# Patient Record
Sex: FEMALE | Race: OTHER | HISPANIC OR LATINO
[De-identification: names, ages, dates, MRNs, and addresses within clinical notes are randomized per-mention and may not be internally consistent; named-entity substitution may affect disease eponyms.]

---

## 2017-06-29 ENCOUNTER — TRANSCRIPTION ENCOUNTER (OUTPATIENT)
Age: 47
End: 2017-06-29

## 2018-11-29 ENCOUNTER — RX CHANGE (OUTPATIENT)
Age: 48
End: 2018-11-29

## 2018-12-14 ENCOUNTER — RESULT REVIEW (OUTPATIENT)
Age: 48
End: 2018-12-14

## 2019-04-29 ENCOUNTER — RX RENEWAL (OUTPATIENT)
Age: 49
End: 2019-04-29

## 2019-04-30 ENCOUNTER — RECORD ABSTRACTING (OUTPATIENT)
Age: 49
End: 2019-04-30

## 2019-04-30 ENCOUNTER — RX RENEWAL (OUTPATIENT)
Age: 49
End: 2019-04-30

## 2019-04-30 DIAGNOSIS — Z82.49 FAMILY HISTORY OF ISCHEMIC HEART DISEASE AND OTHER DISEASES OF THE CIRCULATORY SYSTEM: ICD-10-CM

## 2019-04-30 DIAGNOSIS — Z84.1 FAMILY HISTORY OF DISORDERS OF KIDNEY AND URETER: ICD-10-CM

## 2019-04-30 DIAGNOSIS — R87.629 UNSPECIFIED ABNORMAL CYTOLOGICAL FINDINGS IN SPECIMENS FROM VAGINA: ICD-10-CM

## 2019-04-30 DIAGNOSIS — Z86.018 PERSONAL HISTORY OF OTHER BENIGN NEOPLASM: ICD-10-CM

## 2019-04-30 DIAGNOSIS — D25.9 LEIOMYOMA OF UTERUS, UNSPECIFIED: ICD-10-CM

## 2019-04-30 DIAGNOSIS — Z82.3 FAMILY HISTORY OF STROKE: ICD-10-CM

## 2019-04-30 DIAGNOSIS — Z83.3 FAMILY HISTORY OF DIABETES MELLITUS: ICD-10-CM

## 2019-04-30 LAB — CYTOLOGY CVX/VAG DOC THIN PREP: NORMAL

## 2019-04-30 RX ORDER — MULTIVITAMIN
TABLET ORAL
Refills: 0 | Status: ACTIVE | COMMUNITY

## 2019-07-24 ENCOUNTER — RX RENEWAL (OUTPATIENT)
Age: 49
End: 2019-07-24

## 2019-07-29 ENCOUNTER — RX RENEWAL (OUTPATIENT)
Age: 49
End: 2019-07-29

## 2019-08-06 ENCOUNTER — RX RENEWAL (OUTPATIENT)
Age: 49
End: 2019-08-06

## 2019-08-06 DIAGNOSIS — N39.0 URINARY TRACT INFECTION, SITE NOT SPECIFIED: ICD-10-CM

## 2019-10-21 ENCOUNTER — RX RENEWAL (OUTPATIENT)
Age: 49
End: 2019-10-21

## 2019-11-04 ENCOUNTER — APPOINTMENT (OUTPATIENT)
Dept: OBGYN | Facility: CLINIC | Age: 49
End: 2019-11-04
Payer: COMMERCIAL

## 2019-11-04 VITALS
BODY MASS INDEX: 29.45 KG/M2 | WEIGHT: 150 LBS | DIASTOLIC BLOOD PRESSURE: 78 MMHG | HEIGHT: 60 IN | SYSTOLIC BLOOD PRESSURE: 128 MMHG

## 2019-11-04 DIAGNOSIS — D25.0 SUBMUCOUS LEIOMYOMA OF UTERUS: ICD-10-CM

## 2019-11-04 PROCEDURE — 76830 TRANSVAGINAL US NON-OB: CPT

## 2019-11-04 PROCEDURE — 99396 PREV VISIT EST AGE 40-64: CPT

## 2019-11-06 ENCOUNTER — TRANSCRIPTION ENCOUNTER (OUTPATIENT)
Age: 49
End: 2019-11-06

## 2019-12-03 ENCOUNTER — LABORATORY RESULT (OUTPATIENT)
Age: 49
End: 2019-12-03

## 2019-12-06 ENCOUNTER — RESULT REVIEW (OUTPATIENT)
Age: 49
End: 2019-12-06

## 2020-04-14 ENCOUNTER — RX RENEWAL (OUTPATIENT)
Age: 50
End: 2020-04-14

## 2020-11-16 ENCOUNTER — RESULT REVIEW (OUTPATIENT)
Age: 50
End: 2020-11-16

## 2020-11-16 ENCOUNTER — LABORATORY RESULT (OUTPATIENT)
Age: 50
End: 2020-11-16

## 2020-11-16 ENCOUNTER — APPOINTMENT (OUTPATIENT)
Dept: OBGYN | Facility: CLINIC | Age: 50
End: 2020-11-16
Payer: COMMERCIAL

## 2020-11-16 VITALS
DIASTOLIC BLOOD PRESSURE: 82 MMHG | WEIGHT: 152 LBS | BODY MASS INDEX: 29.84 KG/M2 | HEIGHT: 60 IN | SYSTOLIC BLOOD PRESSURE: 126 MMHG

## 2020-11-16 DIAGNOSIS — Z78.0 ASYMPTOMATIC MENOPAUSAL STATE: ICD-10-CM

## 2020-11-16 DIAGNOSIS — B96.89 ACUTE VAGINITIS: ICD-10-CM

## 2020-11-16 DIAGNOSIS — N76.0 ACUTE VAGINITIS: ICD-10-CM

## 2020-11-16 PROCEDURE — 99072 ADDL SUPL MATRL&STAF TM PHE: CPT

## 2020-11-16 PROCEDURE — 99396 PREV VISIT EST AGE 40-64: CPT

## 2020-11-16 RX ORDER — LEVONORGESTREL AND ETHINYL ESTRADIOL 0.15-0.03
0.15-0.03 KIT ORAL DAILY
Qty: 91 | Refills: 0 | Status: DISCONTINUED | COMMUNITY
Start: 2019-04-29 | End: 2020-11-16

## 2020-11-16 RX ORDER — LEVOFLOXACIN 250 MG/1
250 TABLET, FILM COATED ORAL DAILY
Qty: 5 | Refills: 0 | Status: DISCONTINUED | COMMUNITY
Start: 2019-08-06 | End: 2020-11-16

## 2020-11-16 RX ORDER — LEVONORGESTREL / ETHINYL ESTRADIOL AND ETHINYL ESTRADIOL 150-30(84)
0.15-0.03 &0.01 KIT ORAL DAILY
Refills: 0 | Status: DISCONTINUED | COMMUNITY
End: 2020-11-16

## 2020-11-16 RX ORDER — LEVONORGESTREL AND ETHINYL ESTRADIOL AND ETHINYL ESTRADIOL 150-30(84)
0.15-0.03 &0.01 KIT ORAL
Qty: 91 | Refills: 1 | Status: DISCONTINUED | COMMUNITY
Start: 2019-04-30 | End: 2020-11-16

## 2020-11-16 RX ORDER — TINIDAZOLE 500 MG/1
500 TABLET, FILM COATED ORAL DAILY
Qty: 8 | Refills: 1 | Status: DISCONTINUED | COMMUNITY
Start: 2018-11-29 | End: 2020-11-16

## 2020-11-16 NOTE — HISTORY OF PRESENT ILLNESS
[FreeTextEntry1] : 51yo  amenorrheic since stopping Camrese 0.15-0.03 last year here for annual Gyn exam. Has noticed return of BV sx(but mild)\par            Pt with h/o LGSIL treated with cryo ...has had ASCUS Paps but HPV negative. Last Pap 2017-> Neg/HPV Neg  \par  [Mammogramdate] : 12/14/18 [TextBox_19] : BIRADS 2(scheduled for mammo later today) [PapSmeardate] : 4/26/17 [TextBox_31] : Neg/HPV Neg

## 2020-11-20 LAB
CANDIDA VAG CYTO: NOT DETECTED
CYTOLOGY CVX/VAG DOC THIN PREP: ABNORMAL
G VAGINALIS+PREV SP MTYP VAG QL MICRO: DETECTED
HPV HIGH+LOW RISK DNA PNL CVX: DETECTED
T VAGINALIS VAG QL WET PREP: NOT DETECTED

## 2020-12-21 PROBLEM — N39.0 ACUTE UTI: Status: RESOLVED | Noted: 2019-08-06 | Resolved: 2020-12-21

## 2020-12-23 PROBLEM — N76.0 BACTERIAL VAGINOSIS: Status: RESOLVED | Noted: 2020-11-16 | Resolved: 2020-12-23

## 2021-01-19 ENCOUNTER — RESULT REVIEW (OUTPATIENT)
Age: 51
End: 2021-01-19

## 2021-01-28 ENCOUNTER — TRANSCRIPTION ENCOUNTER (OUTPATIENT)
Age: 51
End: 2021-01-28

## 2021-01-29 ENCOUNTER — TRANSCRIPTION ENCOUNTER (OUTPATIENT)
Age: 51
End: 2021-01-29

## 2021-02-05 ENCOUNTER — TRANSCRIPTION ENCOUNTER (OUTPATIENT)
Age: 51
End: 2021-02-05

## 2021-03-31 ENCOUNTER — TRANSCRIPTION ENCOUNTER (OUTPATIENT)
Age: 51
End: 2021-03-31

## 2021-04-15 ENCOUNTER — TRANSCRIPTION ENCOUNTER (OUTPATIENT)
Age: 51
End: 2021-04-15

## 2021-05-13 ENCOUNTER — TRANSCRIPTION ENCOUNTER (OUTPATIENT)
Age: 51
End: 2021-05-13

## 2021-05-18 ENCOUNTER — NON-APPOINTMENT (OUTPATIENT)
Age: 51
End: 2021-05-18

## 2021-05-19 ENCOUNTER — APPOINTMENT (OUTPATIENT)
Dept: OBGYN | Facility: CLINIC | Age: 51
End: 2021-05-19
Payer: COMMERCIAL

## 2021-05-19 ENCOUNTER — ASOB RESULT (OUTPATIENT)
Age: 51
End: 2021-05-19

## 2021-05-19 VITALS
TEMPERATURE: 98 F | HEIGHT: 60 IN | BODY MASS INDEX: 29.84 KG/M2 | DIASTOLIC BLOOD PRESSURE: 78 MMHG | SYSTOLIC BLOOD PRESSURE: 120 MMHG | WEIGHT: 152 LBS

## 2021-05-19 DIAGNOSIS — R87.610 ATYPICAL SQUAMOUS CELLS OF UNDETERMINED SIGNIFICANCE ON CYTOLOGIC SMEAR OF CERVIX (ASC-US): ICD-10-CM

## 2021-05-19 DIAGNOSIS — Z87.898 PERSONAL HISTORY OF OTHER SPECIFIED CONDITIONS: ICD-10-CM

## 2021-05-19 DIAGNOSIS — Z00.01 ENCOUNTER FOR GENERAL ADULT MEDICAL EXAMINATION WITH ABNORMAL FINDINGS: ICD-10-CM

## 2021-05-19 DIAGNOSIS — Z92.89 PERSONAL HISTORY OF OTHER MEDICAL TREATMENT: ICD-10-CM

## 2021-05-19 DIAGNOSIS — N89.8 OTHER SPECIFIED NONINFLAMMATORY DISORDERS OF VAGINA: ICD-10-CM

## 2021-05-19 DIAGNOSIS — N95.0 POSTMENOPAUSAL BLEEDING: ICD-10-CM

## 2021-05-19 PROCEDURE — 99072 ADDL SUPL MATRL&STAF TM PHE: CPT

## 2021-05-19 PROCEDURE — 76830 TRANSVAGINAL US NON-OB: CPT

## 2021-05-19 PROCEDURE — 99213 OFFICE O/P EST LOW 20 MIN: CPT

## 2021-05-20 PROBLEM — Z87.898 HISTORY OF INTERMENSTRUAL BLEEDING: Status: RESOLVED | Noted: 2019-04-30 | Resolved: 2021-05-20

## 2021-05-20 PROBLEM — R87.610 PAPANICOLAOU SMEAR OF CERVIX WITH ATYPICAL SQUAMOUS CELLS OF UNDETERMINED SIGNIFICANCE (ASC-US): Status: RESOLVED | Noted: 2019-04-30 | Resolved: 2021-05-20

## 2021-05-20 PROBLEM — Z00.01 ENCOUNTER FOR ANNUAL GENERAL MEDICAL EXAMINATION WITH ABNORMAL FINDINGS IN ADULT: Status: RESOLVED | Noted: 2019-11-04 | Resolved: 2021-05-20

## 2021-05-20 PROBLEM — Z92.89 HISTORY OF SCREENING MAMMOGRAPHY: Status: RESOLVED | Noted: 2020-11-16 | Resolved: 2021-05-20

## 2021-05-20 LAB
CANDIDA VAG CYTO: NOT DETECTED
G VAGINALIS+PREV SP MTYP VAG QL MICRO: NOT DETECTED
T VAGINALIS VAG QL WET PREP: NOT DETECTED

## 2021-05-20 RX ORDER — TINIDAZOLE 500 MG/1
500 TABLET, FILM COATED ORAL
Qty: 8 | Refills: 3 | Status: DISCONTINUED | COMMUNITY
Start: 2020-11-16 | End: 2021-05-20

## 2021-05-20 NOTE — PHYSICAL EXAM
[Appropriately responsive] : appropriately responsive [Alert] : alert [No Acute Distress] : no acute distress [Soft] : soft [Non-tender] : non-tender [Oriented x3] : oriented x3 [No Lesions] : no lesions  [Labia Majora] : normal [Labia Minora] : normal [Pink Rugae] : pink rugae [Discharge] : a  ~M vaginal discharge was present [Scant] : scant [White] : white [Mucoid] : mucoid [No Bleeding] : There was no active vaginal bleeding [Normal] : normal [Uterine Adnexae] : normal [Tenderness] : nontender

## 2021-05-20 NOTE — HISTORY OF PRESENT ILLNESS
[FreeTextEntry1] : 49 y/o PM presents for episode of light red to pink vaginal spotting x 2 days last week. Denies pelvic pain or dysuria. No bleeding since that time. No episodes of bleeding prior to that time.  In addition she c/o a vaginal discharge today with history of BV.

## 2021-05-20 NOTE — PROCEDURE
[Abnormal Uterine Bleeding] : abnormal uterine bleeding [Transvaginal Ultrasound] : transvaginal ultrasound [Anteverted] : anteverted [L: ___ cm] : L: [unfilled] cm [W: ___cm] : W: [unfilled] cm [H: ___ cm] : H: [unfilled] cm [FreeTextEntry5] : Myoma: Fundal Posterior:  1.5 x 1.7 x 0.96 cm   Endometrium 3.51 mm [FreeTextEntry7] : 2.25 x 1.95 x 1.12 cm [FreeTextEntry4] : Normal appearing uterus with a small myoma as noted above. The endometrium appears normal. No CDS free fluid is noted. The right ovary has a normal appearance. The left ovary has a normal appearance. Cervix and both adnexa are unremarkable. [FreeTextEntry8] : 2.44 x 2.23 x 2.1 cm

## 2021-05-20 NOTE — PLAN
[FreeTextEntry1] : -BV panel sent. Will treat only for positive result.\par \par -Ultrasound results reviewed with patient. No intervention at this time. Pt advised if any future episodes of vaginal bleeding would need endometrial biopsy and to notify provider.\par \par -Pt verbalized understanding of discussion.\par \par -I have spent 20 minutes on this encounter.\par \par

## 2021-05-25 ENCOUNTER — TRANSCRIPTION ENCOUNTER (OUTPATIENT)
Age: 51
End: 2021-05-25

## 2021-05-28 ENCOUNTER — TRANSCRIPTION ENCOUNTER (OUTPATIENT)
Age: 51
End: 2021-05-28

## 2021-09-02 ENCOUNTER — TRANSCRIPTION ENCOUNTER (OUTPATIENT)
Age: 51
End: 2021-09-02

## 2021-09-05 ENCOUNTER — TRANSCRIPTION ENCOUNTER (OUTPATIENT)
Age: 51
End: 2021-09-05

## 2021-11-18 ENCOUNTER — RESULT REVIEW (OUTPATIENT)
Age: 51
End: 2021-11-18

## 2021-12-11 ENCOUNTER — NON-APPOINTMENT (OUTPATIENT)
Age: 51
End: 2021-12-11

## 2022-02-14 ENCOUNTER — LABORATORY RESULT (OUTPATIENT)
Age: 52
End: 2022-02-14

## 2022-02-14 ENCOUNTER — APPOINTMENT (OUTPATIENT)
Dept: OBGYN | Facility: CLINIC | Age: 52
End: 2022-02-14
Payer: COMMERCIAL

## 2022-02-14 DIAGNOSIS — B97.7 PAPILLOMAVIRUS AS THE CAUSE OF DISEASES CLASSIFIED ELSEWHERE: ICD-10-CM

## 2022-02-14 PROCEDURE — 99396 PREV VISIT EST AGE 40-64: CPT

## 2022-02-14 NOTE — HISTORY OF PRESENT ILLNESS
[Patient reported colonoscopy was normal] : Patient reported colonoscopy was normal [FreeTextEntry1] : 52yo  postmenopausal without HRT here for annual Gyn exam. Has had no further vaginal staining. Has not had BV symptoms in almost a year.\par \par \par OB History\par   Total pregnancies  1.  Total living children  1.  C section(s)  1.  Pregnancy 1:  1993 , Primary , Birth Weight:5lbs 14oz, Female "Toña". \par  [Mammogramdate] : 11/18/21 [TextBox_19] : BIRADS 2 [PapSmeardate] : 11/16/20 [TextBox_31] : Negative / HPV +/ 16,18,45 Negative  [BoneDensityDate] : never [ColonoscopyDate] : age 49 [TextBox_43] : 5 year recall

## 2022-02-21 LAB
CYTOLOGY CVX/VAG DOC THIN PREP: ABNORMAL
HPV HIGH+LOW RISK DNA PNL CVX: DETECTED

## 2022-11-22 ENCOUNTER — RESULT REVIEW (OUTPATIENT)
Age: 52
End: 2022-11-22

## 2023-02-27 ENCOUNTER — APPOINTMENT (OUTPATIENT)
Dept: INTERNAL MEDICINE | Facility: CLINIC | Age: 53
End: 2023-02-27
Payer: COMMERCIAL

## 2023-02-27 VITALS
OXYGEN SATURATION: 98 % | HEART RATE: 87 BPM | BODY MASS INDEX: 30.04 KG/M2 | DIASTOLIC BLOOD PRESSURE: 88 MMHG | HEIGHT: 60 IN | WEIGHT: 153 LBS | SYSTOLIC BLOOD PRESSURE: 138 MMHG

## 2023-02-27 DIAGNOSIS — Z00.00 ENCOUNTER FOR GENERAL ADULT MEDICAL EXAMINATION W/OUT ABNORMAL FINDINGS: ICD-10-CM

## 2023-02-27 PROCEDURE — 36415 COLL VENOUS BLD VENIPUNCTURE: CPT

## 2023-02-27 PROCEDURE — 99386 PREV VISIT NEW AGE 40-64: CPT | Mod: 25

## 2023-02-27 RX ORDER — CLINDAMYCIN PHOSPHATE 20 MG/G
2 CREAM VAGINAL
Qty: 1 | Refills: 0 | Status: DISCONTINUED | COMMUNITY
Start: 2021-05-28 | End: 2023-02-27

## 2023-02-27 RX ORDER — GABAPENTIN 100 MG/1
100 CAPSULE ORAL
Qty: 90 | Refills: 0 | Status: DISCONTINUED | COMMUNITY
Start: 2021-09-23 | End: 2023-02-27

## 2023-03-01 ENCOUNTER — TRANSCRIPTION ENCOUNTER (OUTPATIENT)
Age: 53
End: 2023-03-01

## 2023-03-01 LAB
25(OH)D3 SERPL-MCNC: 63.7 NG/ML
ALBUMIN SERPL ELPH-MCNC: 4.3 G/DL
ALP BLD-CCNC: 104 U/L
ALT SERPL-CCNC: 15 U/L
ANION GAP SERPL CALC-SCNC: 13 MMOL/L
AST SERPL-CCNC: 17 U/L
BASOPHILS # BLD AUTO: 0.07 K/UL
BASOPHILS NFR BLD AUTO: 1 %
BILIRUB SERPL-MCNC: 0.3 MG/DL
BUN SERPL-MCNC: 13 MG/DL
CALCIUM SERPL-MCNC: 9.7 MG/DL
CHLORIDE SERPL-SCNC: 103 MMOL/L
CHOLEST SERPL-MCNC: 245 MG/DL
CO2 SERPL-SCNC: 22 MMOL/L
CREAT SERPL-MCNC: 0.82 MG/DL
EGFR: 85 ML/MIN/1.73M2
EOSINOPHIL # BLD AUTO: 0.1 K/UL
EOSINOPHIL NFR BLD AUTO: 1.4 %
ESTIMATED AVERAGE GLUCOSE: 114 MG/DL
GLUCOSE SERPL-MCNC: 85 MG/DL
HBA1C MFR BLD HPLC: 5.6 %
HCT VFR BLD CALC: 42.4 %
HDLC SERPL-MCNC: 65 MG/DL
HGB BLD-MCNC: 13.9 G/DL
IMM GRANULOCYTES NFR BLD AUTO: 0.3 %
LDLC SERPL CALC-MCNC: 156 MG/DL
LYMPHOCYTES # BLD AUTO: 2.44 K/UL
LYMPHOCYTES NFR BLD AUTO: 35.4 %
MAN DIFF?: NORMAL
MCHC RBC-ENTMCNC: 26.7 PG
MCHC RBC-ENTMCNC: 32.8 GM/DL
MCV RBC AUTO: 81.4 FL
MONOCYTES # BLD AUTO: 0.36 K/UL
MONOCYTES NFR BLD AUTO: 5.2 %
NEUTROPHILS # BLD AUTO: 3.91 K/UL
NEUTROPHILS NFR BLD AUTO: 56.7 %
NONHDLC SERPL-MCNC: 180 MG/DL
PLATELET # BLD AUTO: 299 K/UL
POTASSIUM SERPL-SCNC: 4.8 MMOL/L
PROT SERPL-MCNC: 7.1 G/DL
RBC # BLD: 5.21 M/UL
RBC # FLD: 14.9 %
SODIUM SERPL-SCNC: 139 MMOL/L
TRIGL SERPL-MCNC: 122 MG/DL
TSH SERPL-ACNC: 1.13 UIU/ML
WBC # FLD AUTO: 6.9 K/UL

## 2023-03-06 RX ORDER — HYDROCORTISONE 1 %
10 GEL (GRAM) TOPICAL
Refills: 0 | Status: ACTIVE | COMMUNITY

## 2023-03-06 NOTE — HISTORY OF PRESENT ILLNESS
[FreeTextEntry1] : physical and establish care [de-identified] : Patient is a 52F cervical radiculopathy, left wrist tendonitis presents today for annual exam\par \par s/p EMELIA in both wrists in 2020 and has been fine since then\par 6/23/2021 - hemangioma in liver - was found on investigation for LFTs. \par 12/7/21 - follow up for gallbladder - hx of polyps in gallbladder. Saw a specialist in 12/2021 for this and reassurance provided. \par 9/28/22 - palpitations, saw NP in cardiology. EKG was done and had zio patch x 5 days and all was normal\par Derm: 12/2/21 Dr. Whitt removed spot on leg that was benign\par \par Started menopause otc - "sleep calm", "cyclease" which sometimes helps with symptoms\par \par Exercises on the Treadmill  3 miles daily\par \par \par

## 2023-03-06 NOTE — HEALTH RISK ASSESSMENT
[Good] : ~his/her~  mood as  good [Yes] : Yes [No] : In the past 12 months have you used drugs other than those required for medical reasons? No [No falls in past year] : Patient reported no falls in the past year [0] : 2) Feeling down, depressed, or hopeless: Not at all (0) [Patient reported colonoscopy was abnormal] : Patient reported colonoscopy was abnormal [HIV Test offered] : HIV Test offered [Hepatitis C test offered] : Hepatitis C test offered [None] : None [With Family] : lives with family [Employed] : employed [High School] : high school [] :  [# Of Children ___] : has [unfilled] children [Smoke Detector] : smoke detector [Carbon Monoxide Detector] : carbon monoxide detector [Guns at Home] : guns at home [Safety elements used in home] : safety elements used in home [Seat Belt] :  uses seat belt [Relationship: ___] : Relationship: [unfilled] [Never] : Never [Monthly or less (1 pt)] : Monthly or less (1 point) [1 or 2 (0 pts)] : 1 or 2 (0 points) [Never (0 pts)] : Never (0 points) [Patient reported PAP Smear was abnormal] : Patient reported PAP Smear was abnormal [PHQ-2 Negative - No further assessment needed] : PHQ-2 Negative - No further assessment needed [Patient reported mammogram was normal] : Patient reported mammogram was normal [Audit-CScore] : 1 [de-identified] : 3x per week [de-identified] : Regular [[id number]] : 0 [MammogramDate] : 11/22 [PapSmearDate] : 02/22 [ColonoscopyDate] : 09/2018 [ColonoscopyComments] : Benign polyps/ f/u in 5 years    [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

## 2023-03-11 ENCOUNTER — TRANSCRIPTION ENCOUNTER (OUTPATIENT)
Age: 53
End: 2023-03-11

## 2023-03-14 ENCOUNTER — TRANSCRIPTION ENCOUNTER (OUTPATIENT)
Age: 53
End: 2023-03-14

## 2023-03-21 ENCOUNTER — RESULT REVIEW (OUTPATIENT)
Age: 53
End: 2023-03-21

## 2023-04-05 ENCOUNTER — TRANSCRIPTION ENCOUNTER (OUTPATIENT)
Age: 53
End: 2023-04-05

## 2023-04-26 ENCOUNTER — NON-APPOINTMENT (OUTPATIENT)
Age: 53
End: 2023-04-26

## 2023-05-17 ENCOUNTER — TRANSCRIPTION ENCOUNTER (OUTPATIENT)
Age: 53
End: 2023-05-17

## 2023-05-24 ENCOUNTER — TRANSCRIPTION ENCOUNTER (OUTPATIENT)
Age: 53
End: 2023-05-24

## 2023-06-15 ENCOUNTER — APPOINTMENT (OUTPATIENT)
Dept: HEPATOLOGY | Facility: CLINIC | Age: 53
End: 2023-06-15
Payer: COMMERCIAL

## 2023-06-15 VITALS
WEIGHT: 153 LBS | HEIGHT: 60 IN | BODY MASS INDEX: 30.04 KG/M2 | HEART RATE: 67 BPM | RESPIRATION RATE: 18 BRPM | OXYGEN SATURATION: 98 %

## 2023-06-15 PROCEDURE — 99204 OFFICE O/P NEW MOD 45 MIN: CPT

## 2023-06-15 NOTE — HISTORY OF PRESENT ILLNESS
[de-identified] : Ms. FROST is a 53 year old woman who had elevated LFTs in the past and was found to have a hepatic hemangioma and a gallbladder polyp. \par She tells me that she had high liver enzymes in 11/2020, and and US in 6/2021 showed gallbladder polyp 0.6 mm and a small blood clot in the liver. A repeat US abdomen in Dec 2021 showed gallbladder polyps.\par A third US in 3/2023 see below.\par \par \par Alcohol history: does not drink\par Weight history: 153 lbs, BMI 29.9 - has been constant - never weighed more except during pregnancy. She lost weight in 2016 by exercising a lot, and regained after she had more time and went to the gym 4 times a week for several hours.\par Remedies/OTC meds:\par FHx: DM in parents, father probably had cirrhosis and almost needed dialysis before he passed.\par \par ROS: \par Constitutional: no fever, fatigue, weight gain/loss. Eyes: no eye pain or discharge. ENT: no nosebleed, earache, change in hearing. CVS: denies chest pain, palpitations, claudication, irregular heartbeat. Resp: denies SOB, wheezing, cough, SOB on exertion. GI: denies abdominal pain, vomiting, diarrhea, heartburn, melena. Genitourinary: denies dysuria, incontinence. Musculoskeletal: denies joint pain, joint stiffness. Integumentary: denies pruritus, skin lesions, rash. Neuro: denies confusion, dizziness, fainting. Psych: denies anxiety, depression, change in personality. Endo: denies muscle weakness. Heme/lymph: denies easy bleeding and bruising.\par \par Workup:\par - 3/22/23 US abdomen limited: mild hepatic steatosis, small focal hyperechoic focus L lobe - most likely hemangioma, several small gallbladder polyps.\par \par Physical exam:\par Gen: A&Ox3, NAD\par HEENT: normal outer ears & nose, PERRL, mucus membranes moist, anicteric, no lymphadenopathy\par CVS: regular rate and rhythm, no murmur\par Pulm: vesicular breath sounds\par Abdomen: normal bowel sounds, soft, nontender, no hepatosplenomegaly \par Legs: no edema, no clubbing\par Musculoskeletal: normal gait\par Skin: no rash\par Neuro: no asterixis, EOMI\par Psych: normal affect\par

## 2023-06-16 LAB
AFP-TM SERPL-MCNC: 4 NG/ML
ALBUMIN SERPL ELPH-MCNC: 4.6 G/DL
ALP BLD-CCNC: 109 U/L
ALT SERPL-CCNC: 15 U/L
ANA SER IF-ACNC: NEGATIVE
ANION GAP SERPL CALC-SCNC: 14 MMOL/L
AST SERPL-CCNC: 16 U/L
BILIRUB SERPL-MCNC: 0.2 MG/DL
BUN SERPL-MCNC: 19 MG/DL
CALCIUM SERPL-MCNC: 9.4 MG/DL
CHLORIDE SERPL-SCNC: 104 MMOL/L
CO2 SERPL-SCNC: 25 MMOL/L
CREAT SERPL-MCNC: 0.87 MG/DL
EGFR: 79 ML/MIN/1.73M2
GLUCOSE SERPL-MCNC: 121 MG/DL
HBV CORE IGG+IGM SER QL: NONREACTIVE
HBV SURFACE AB SER QL: NONREACTIVE
HBV SURFACE AG SER QL: NONREACTIVE
HCV AB SER QL: NONREACTIVE
HCV S/CO RATIO: 0.09 S/CO
HEPATITIS A IGG ANTIBODY: NONREACTIVE
POTASSIUM SERPL-SCNC: 4.7 MMOL/L
PROT SERPL-MCNC: 7.3 G/DL
SODIUM SERPL-SCNC: 144 MMOL/L

## 2023-07-20 ENCOUNTER — APPOINTMENT (OUTPATIENT)
Dept: HEPATOLOGY | Facility: CLINIC | Age: 53
End: 2023-07-20
Payer: COMMERCIAL

## 2023-07-20 VITALS
DIASTOLIC BLOOD PRESSURE: 80 MMHG | SYSTOLIC BLOOD PRESSURE: 130 MMHG | HEIGHT: 60 IN | BODY MASS INDEX: 30.04 KG/M2 | WEIGHT: 153 LBS | HEART RATE: 70 BPM

## 2023-07-20 DIAGNOSIS — Z13.1 ENCOUNTER FOR SCREENING FOR DIABETES MELLITUS: ICD-10-CM

## 2023-07-20 DIAGNOSIS — D18.00 HEMANGIOMA UNSPECIFIED SITE: ICD-10-CM

## 2023-07-20 DIAGNOSIS — E78.00 PURE HYPERCHOLESTEROLEMIA, UNSPECIFIED: ICD-10-CM

## 2023-07-20 DIAGNOSIS — E66.3 OVERWEIGHT: ICD-10-CM

## 2023-07-20 DIAGNOSIS — K76.0 FATTY (CHANGE OF) LIVER, NOT ELSEWHERE CLASSIFIED: ICD-10-CM

## 2023-07-20 DIAGNOSIS — K76.9 LIVER DISEASE, UNSPECIFIED: ICD-10-CM

## 2023-07-20 DIAGNOSIS — R79.89 OTHER SPECIFIED ABNORMAL FINDINGS OF BLOOD CHEMISTRY: ICD-10-CM

## 2023-07-20 DIAGNOSIS — K82.4 CHOLESTEROLOSIS OF GALLBLADDER: ICD-10-CM

## 2023-07-20 PROCEDURE — 99214 OFFICE O/P EST MOD 30 MIN: CPT

## 2023-07-20 NOTE — ASSESSMENT
[FreeTextEntry1] : \par - reported history of elevated AST in 2021, normal twice in 2023\par - fatty liver seen on US 3/2023, not on repeat 7/2023\par - small solid liver lesion left lobe, likely hemangioma, not seen on repeat US at [facility] 7/8/23\par - overweight, BMI 29; no h/o obesity; no alcohol use\par - hypercholesteremia\par - FHx of diabetes in her parents. 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 \par \par \par - small gallbladder polyps, first seen in 2020, again on US in 2023\par \par - colonoscopy: 9/2018 elsewhere, reportedly 2 small polyps\par \par I discussed that fibroscan is good, but sometimes off, and that further evaluation is needed to determine whether she has liver fibrosis, as the repeat ultrasound did not show a fatty liver, and her risk factors are only mildly elevated cholesterol and overweight. \par \par \par Plan:\par - return in 1 year after repeat bloodwork, US abdomen and fibroscan\par

## 2024-05-06 ENCOUNTER — APPOINTMENT (OUTPATIENT)
Dept: OBGYN | Facility: CLINIC | Age: 54
End: 2024-05-06
Payer: COMMERCIAL

## 2024-05-06 ENCOUNTER — LABORATORY RESULT (OUTPATIENT)
Age: 54
End: 2024-05-06

## 2024-05-06 VITALS
WEIGHT: 154 LBS | SYSTOLIC BLOOD PRESSURE: 128 MMHG | DIASTOLIC BLOOD PRESSURE: 79 MMHG | BODY MASS INDEX: 30.23 KG/M2 | HEIGHT: 60 IN

## 2024-05-06 DIAGNOSIS — R92.30 DENSE BREASTS, UNSPECIFIED: ICD-10-CM

## 2024-05-06 DIAGNOSIS — Z12.39 ENCOUNTER FOR OTHER SCREENING FOR MALIGNANT NEOPLASM OF BREAST: ICD-10-CM

## 2024-05-06 DIAGNOSIS — Z01.419 ENCOUNTER FOR GYNECOLOGICAL EXAMINATION (GENERAL) (ROUTINE) W/OUT ABNORMAL FINDINGS: ICD-10-CM

## 2024-05-06 PROCEDURE — 99396 PREV VISIT EST AGE 40-64: CPT

## 2024-05-06 PROCEDURE — 99459 PELVIC EXAMINATION: CPT

## 2024-05-07 PROBLEM — Z01.419 WELL WOMAN EXAM WITH ROUTINE GYNECOLOGICAL EXAM: Status: ACTIVE | Noted: 2024-05-06

## 2024-05-07 NOTE — HISTORY OF PRESENT ILLNESS
[Patient reported mammogram was normal] : Patient reported mammogram was normal [Patient reported PAP Smear was normal] : Patient reported PAP Smear was normal [Currently Active] : currently active [Men] : men [No] : No [FreeTextEntry1] : 51-year-old -0-1-1 postmenopausal here for well woman exam. Patient denies pelvic pain abnormal vaginal discharge or postmenopausal bleeding. Exercises regularly walks and treadmills.   GYN history: Hx of fibroids and ovarian cyst. s/p myomectomies. Known history of positive HPV has had possible cervical freezing years ago denies distinct biopsies or cervical excisions of her cervix. Has had HPV +2020 unable to repeat Pap in  testing has been negative reflexively for high risk 16/18 or 45   [Patient reported colonoscopy was normal] : Patient reported colonoscopy was normal [Mammogramdate] : 2022 [TextBox_19] : BIRADS I [PapSmeardate] : 2/2022 [TextBox_31] : NILM/ HPV positive, negative for HR HPV [ColonoscopyDate] : 2/2024

## 2024-05-07 NOTE — PHYSICAL EXAM
[Chaperone Present] : A chaperone was present in the examining room during all aspects of the physical examination [[id number]] : A chaperone was present during the pelvic exam. [Appropriately responsive] : appropriately responsive [Alert] : alert [No Acute Distress] : no acute distress [No Lymphadenopathy] : no lymphadenopathy [Regular Rate Rhythm] : regular rate rhythm [No Murmurs] : no murmurs [Clear to Auscultation B/L] : clear to auscultation bilaterally [Soft] : soft [Non-tender] : non-tender [Non-distended] : non-distended [No Lesions] : no lesions [No Mass] : no mass [Oriented x3] : oriented x3 [Examination Of The Breasts] : a normal appearance [No Masses] : no breast masses were palpable [Labia Majora] : normal [Labia Minora] : normal [Normal] : normal [Normal Position] : in a normal position [Uterine Adnexae] : non-palpable [FreeTextEntry2] : MARKELL Isbell [Tenderness] : nontender

## 2024-05-08 LAB — HPV HIGH+LOW RISK DNA PNL CVX: DETECTED

## 2024-05-14 ENCOUNTER — APPOINTMENT (OUTPATIENT)
Dept: DERMATOLOGY | Facility: CLINIC | Age: 54
End: 2024-05-14
Payer: COMMERCIAL

## 2024-05-14 ENCOUNTER — TRANSCRIPTION ENCOUNTER (OUTPATIENT)
Age: 54
End: 2024-05-14

## 2024-05-14 VITALS — BODY MASS INDEX: 30.04 KG/M2 | WEIGHT: 153 LBS | HEIGHT: 60 IN

## 2024-05-14 DIAGNOSIS — D22.9 MELANOCYTIC NEVI, UNSPECIFIED: ICD-10-CM

## 2024-05-14 DIAGNOSIS — D18.01 HEMANGIOMA OF SKIN AND SUBCUTANEOUS TISSUE: ICD-10-CM

## 2024-05-14 LAB — CYTOLOGY CVX/VAG DOC THIN PREP: ABNORMAL

## 2024-05-14 PROCEDURE — 99203 OFFICE O/P NEW LOW 30 MIN: CPT

## 2024-05-14 NOTE — PHYSICAL EXAM
[Full Body Skin Exam Performed] : performed [FreeTextEntry3] : Fairly uniform and regular brown macules and papules on the trunk and extremities  Several scattered red partially blanching papules on the trunk and extremities.

## 2024-05-14 NOTE — ASSESSMENT
[FreeTextEntry1] : 1) Nevi/cherry angiomas - Counseled about clinically benign lesions - Discussed regular OTC sunscreen use, SPF 30 or higher   2) Skin cancer Screening - No lesions clinically concerning for malignancy today - Discussed regular self skin checks and ABCDEs of skin cancer screening - Discussed regular sunscreen use - Pt instructed to report any new or changing lesions  RTC 1 yr for FBSE or sooner if any concerns

## 2024-05-14 NOTE — HISTORY OF PRESENT ILLNESS
[de-identified] : # Mole/skin check Concerns today: Sunscreen use: face everyday  Hx of blistering sun burns: no  Personal history of skin cancer:  none  Family history of skin cancer: maternal grandfather, hx of skin cancer. Patient unsure of which type of skin cancer.   Patient declines to have chaperone for skin check. [FreeTextEntry1] : skin check

## 2024-05-15 ENCOUNTER — TRANSCRIPTION ENCOUNTER (OUTPATIENT)
Age: 54
End: 2024-05-15

## 2024-06-21 ENCOUNTER — RESULT REVIEW (OUTPATIENT)
Age: 54
End: 2024-06-21

## 2024-07-10 ENCOUNTER — APPOINTMENT (OUTPATIENT)
Dept: INTERNAL MEDICINE | Facility: CLINIC | Age: 54
End: 2024-07-10
Payer: COMMERCIAL

## 2024-07-10 VITALS
OXYGEN SATURATION: 97 % | BODY MASS INDEX: 31.02 KG/M2 | TEMPERATURE: 98.5 F | HEART RATE: 88 BPM | HEIGHT: 60 IN | SYSTOLIC BLOOD PRESSURE: 142 MMHG | WEIGHT: 158 LBS | DIASTOLIC BLOOD PRESSURE: 80 MMHG

## 2024-07-10 DIAGNOSIS — Z23 ENCOUNTER FOR IMMUNIZATION: ICD-10-CM

## 2024-07-10 DIAGNOSIS — Z00.00 ENCOUNTER FOR GENERAL ADULT MEDICAL EXAMINATION W/OUT ABNORMAL FINDINGS: ICD-10-CM

## 2024-07-10 DIAGNOSIS — R10.31 RIGHT LOWER QUADRANT PAIN: ICD-10-CM

## 2024-07-10 PROCEDURE — 99213 OFFICE O/P EST LOW 20 MIN: CPT | Mod: 25

## 2024-07-10 PROCEDURE — 90471 IMMUNIZATION ADMIN: CPT

## 2024-07-10 PROCEDURE — 99396 PREV VISIT EST AGE 40-64: CPT | Mod: 25

## 2024-07-10 PROCEDURE — 90715 TDAP VACCINE 7 YRS/> IM: CPT | Mod: GY

## 2024-07-10 PROCEDURE — 36415 COLL VENOUS BLD VENIPUNCTURE: CPT

## 2024-07-15 ENCOUNTER — TRANSCRIPTION ENCOUNTER (OUTPATIENT)
Age: 54
End: 2024-07-15

## 2024-07-15 LAB
BASOPHILS NFR BLD AUTO: 0.8 %
EOSINOPHIL # BLD AUTO: 0.09 K/UL
EOSINOPHIL NFR BLD AUTO: 1.5 %
HCT VFR BLD CALC: 45.2 %
HGB BLD-MCNC: 13.9 G/DL
IMM GRANULOCYTES NFR BLD AUTO: 0.5 %
LYMPHOCYTES # BLD AUTO: 1.7 K/UL
LYMPHOCYTES NFR BLD AUTO: 28.2 %
MAN DIFF?: NORMAL
MCHC RBC-ENTMCNC: 26.2 PG
MCHC RBC-ENTMCNC: 30.8 GM/DL
MCV RBC AUTO: 85.3 FL
MONOCYTES # BLD AUTO: 0.3 K/UL
MONOCYTES NFR BLD AUTO: 5 %
NEUTROPHILS # BLD AUTO: 3.86 K/UL
NEUTROPHILS NFR BLD AUTO: 64 %
PLATELET # BLD AUTO: 295 K/UL
RBC # BLD: 5.3 M/UL
RBC # FLD: 16.7 %
WBC # FLD AUTO: 6.03 K/UL

## 2024-07-16 ENCOUNTER — RESULT REVIEW (OUTPATIENT)
Age: 54
End: 2024-07-16

## 2024-07-18 ENCOUNTER — TRANSCRIPTION ENCOUNTER (OUTPATIENT)
Age: 54
End: 2024-07-18

## 2024-07-24 ENCOUNTER — RESULT REVIEW (OUTPATIENT)
Age: 54
End: 2024-07-24

## 2024-07-26 ENCOUNTER — TRANSCRIPTION ENCOUNTER (OUTPATIENT)
Age: 54
End: 2024-07-26

## 2024-07-26 DIAGNOSIS — E78.00 PURE HYPERCHOLESTEROLEMIA, UNSPECIFIED: ICD-10-CM

## 2024-07-26 LAB
ALBUMIN SERPL ELPH-MCNC: 4.5 G/DL
ALP BLD-CCNC: 100 U/L
ALT SERPL-CCNC: 17 U/L
ANION GAP SERPL CALC-SCNC: 12 MMOL/L
AST SERPL-CCNC: 19 U/L
BILIRUB SERPL-MCNC: 0.4 MG/DL
BUN SERPL-MCNC: 14 MG/DL
CALCIUM SERPL-MCNC: 9.6 MG/DL
CHLORIDE SERPL-SCNC: 103 MMOL/L
CHOLEST SERPL-MCNC: 244 MG/DL
CO2 SERPL-SCNC: 25 MMOL/L
CREAT SERPL-MCNC: 1 MG/DL
EGFR: 66 ML/MIN/1.73M2
GLUCOSE SERPL-MCNC: 94 MG/DL
HDLC SERPL-MCNC: 65 MG/DL
LDLC SERPL CALC-MCNC: 163 MG/DL
NONHDLC SERPL-MCNC: 179 MG/DL
POTASSIUM SERPL-SCNC: 4.8 MMOL/L
PROT SERPL-MCNC: 7.4 G/DL
SODIUM SERPL-SCNC: 140 MMOL/L
TRIGL SERPL-MCNC: 95 MG/DL

## 2024-07-29 ENCOUNTER — TRANSCRIPTION ENCOUNTER (OUTPATIENT)
Age: 54
End: 2024-07-29

## 2024-07-30 ENCOUNTER — TRANSCRIPTION ENCOUNTER (OUTPATIENT)
Age: 54
End: 2024-07-30

## 2024-08-03 ENCOUNTER — RESULT REVIEW (OUTPATIENT)
Age: 54
End: 2024-08-03

## 2024-08-08 ENCOUNTER — APPOINTMENT (OUTPATIENT)
Dept: HEPATOLOGY | Facility: CLINIC | Age: 54
End: 2024-08-08

## 2024-08-10 ENCOUNTER — TRANSCRIPTION ENCOUNTER (OUTPATIENT)
Age: 54
End: 2024-08-10

## 2024-08-29 ENCOUNTER — TRANSCRIPTION ENCOUNTER (OUTPATIENT)
Age: 54
End: 2024-08-29

## 2024-09-05 ENCOUNTER — APPOINTMENT (OUTPATIENT)
Dept: HEPATOLOGY | Facility: CLINIC | Age: 54
End: 2024-09-05
Payer: COMMERCIAL

## 2024-09-05 VITALS
WEIGHT: 152 LBS | DIASTOLIC BLOOD PRESSURE: 90 MMHG | OXYGEN SATURATION: 98 % | SYSTOLIC BLOOD PRESSURE: 140 MMHG | HEART RATE: 81 BPM | HEIGHT: 60 IN | BODY MASS INDEX: 29.84 KG/M2

## 2024-09-05 DIAGNOSIS — K75.81 NONALCOHOLIC STEATOHEPATITIS (NASH): ICD-10-CM

## 2024-09-05 DIAGNOSIS — E78.00 PURE HYPERCHOLESTEROLEMIA, UNSPECIFIED: ICD-10-CM

## 2024-09-05 DIAGNOSIS — K74.00 HEPATIC FIBROSIS, UNSPECIFIED: ICD-10-CM

## 2024-09-05 DIAGNOSIS — Z13.1 ENCOUNTER FOR SCREENING FOR DIABETES MELLITUS: ICD-10-CM

## 2024-09-05 DIAGNOSIS — R79.89 OTHER SPECIFIED ABNORMAL FINDINGS OF BLOOD CHEMISTRY: ICD-10-CM

## 2024-09-05 DIAGNOSIS — K82.4 CHOLESTEROLOSIS OF GALLBLADDER: ICD-10-CM

## 2024-09-05 PROCEDURE — G2211 COMPLEX E/M VISIT ADD ON: CPT | Mod: NC

## 2024-09-05 PROCEDURE — 99214 OFFICE O/P EST MOD 30 MIN: CPT

## 2024-09-05 NOTE — ASSESSMENT
[FreeTextEntry1] : Ms. FROST is a 54-year-old woman who had elevated LFTs in the past and was found to have a hepatic hemangioma and a gallbladder polyp.   - reported history of elevated AST in 2021, normal twice in 2023 - fatty liver seen on US 3/2023, not on repeat 7/2023 - small solid liver lesion left lobe, likely hemangioma, not seen on repeat US at [facility] 7/8/23 - overweight, BMI 29; no h/o obesity; no alcohol use - hypercholesteremia - FHx of diabetes in her parents. 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    - small gallbladder polyps, first seen in 2020, again on US in 2023  - colonoscopy: 9/2018 elsewhere, reportedly 2 small polyps    Plan: - return in 1 year after repeat bloodwork, US abdomen to f/u on the gallbladder polyps, and shear-wave elastography - shear wave elastography now. She will call the day after it was faxed to us.

## 2024-09-05 NOTE — HISTORY OF PRESENT ILLNESS
[FreeTextEntry1] : - 9/5/24: returns after bloodwork and U S abdomen. Shear wave elastography not done. Feels good. Weight stable. Exam: 152 lbs, BMI 29.7. Labs 7/24: normal: , LFTs with ALT 17, BMP. Abnormal cholesterol 244, .  - 7/20/23: doing well Exam: 153 lbs, BMI 29.9. Labs 6/15: LFTs nl.  - 6/15/213: Ms. FROST is a 53 year old woman who had elevated LFTs in the past and was found to have a hepatic hemangioma and a gallbladder polyp.  She tells me that she had high liver enzymes in 11/2020, and and US in 6/2021 showed gallbladder polyp 0.6 mm and a small blood clot in the liver. A repeat US abdomen in Dec 2021 showed gallbladder polyps. A third US in 3/2023 see below.  Alcohol history: does not drink Weight history: 153 lbs, BMI 29.9 - has been constant - never weighed more except during pregnancy. She lost weight in 2016 by exercising a lot, and regained after she had more time and went to the gym 4 times a week for several hours. Remedies/OTC meds: FHx: DM in parents, father probably had cirrhosis and almost needed dialysis before he passed.  Workup: - 6/24/24 US abdomen: normal hepatic parenchyma, echogenic lesion L lobe 9 mm likely unchanged hemangioma. Gallbladder polyps 7 mm, 5 mm and 5 mm. - 3/22/23 US abdomen limited: mild hepatic steatosis, small focal hyperechoic focus L lobe - most likely hemangioma, several small gallbladder polyps.

## 2024-09-25 ENCOUNTER — TRANSCRIPTION ENCOUNTER (OUTPATIENT)
Age: 54
End: 2024-09-25

## 2024-10-02 ENCOUNTER — TRANSCRIPTION ENCOUNTER (OUTPATIENT)
Age: 54
End: 2024-10-02

## 2024-10-09 ENCOUNTER — TRANSCRIPTION ENCOUNTER (OUTPATIENT)
Age: 54
End: 2024-10-09

## 2024-10-14 ENCOUNTER — LABORATORY RESULT (OUTPATIENT)
Age: 54
End: 2024-10-14

## 2024-10-15 ENCOUNTER — TRANSCRIPTION ENCOUNTER (OUTPATIENT)
Age: 54
End: 2024-10-15

## 2024-11-05 ENCOUNTER — TRANSCRIPTION ENCOUNTER (OUTPATIENT)
Age: 54
End: 2024-11-05

## 2025-01-23 ENCOUNTER — NON-APPOINTMENT (OUTPATIENT)
Age: 55
End: 2025-01-23

## 2025-02-11 ENCOUNTER — NON-APPOINTMENT (OUTPATIENT)
Age: 55
End: 2025-02-11

## 2025-02-26 ENCOUNTER — NON-APPOINTMENT (OUTPATIENT)
Age: 55
End: 2025-02-26

## 2025-03-03 ENCOUNTER — APPOINTMENT (OUTPATIENT)
Dept: INTERNAL MEDICINE | Facility: CLINIC | Age: 55
End: 2025-03-03
Payer: COMMERCIAL

## 2025-03-03 VITALS
HEART RATE: 87 BPM | OXYGEN SATURATION: 98 % | BODY MASS INDEX: 29.64 KG/M2 | WEIGHT: 151 LBS | DIASTOLIC BLOOD PRESSURE: 90 MMHG | SYSTOLIC BLOOD PRESSURE: 118 MMHG | HEIGHT: 60 IN

## 2025-03-03 DIAGNOSIS — R00.2 PALPITATIONS: ICD-10-CM

## 2025-03-03 DIAGNOSIS — E78.00 PURE HYPERCHOLESTEROLEMIA, UNSPECIFIED: ICD-10-CM

## 2025-03-03 PROCEDURE — 99213 OFFICE O/P EST LOW 20 MIN: CPT

## 2025-03-03 PROCEDURE — 36415 COLL VENOUS BLD VENIPUNCTURE: CPT

## 2025-03-03 PROCEDURE — G2211 COMPLEX E/M VISIT ADD ON: CPT | Mod: NC

## 2025-03-04 ENCOUNTER — TRANSCRIPTION ENCOUNTER (OUTPATIENT)
Age: 55
End: 2025-03-04

## 2025-03-04 DIAGNOSIS — E87.5 HYPERKALEMIA: ICD-10-CM

## 2025-03-04 LAB
ALBUMIN SERPL ELPH-MCNC: 4.6 G/DL
ALP BLD-CCNC: 107 U/L
ALT SERPL-CCNC: 18 U/L
ANION GAP SERPL CALC-SCNC: 16 MMOL/L
AST SERPL-CCNC: 25 U/L
BILIRUB SERPL-MCNC: 0.3 MG/DL
BUN SERPL-MCNC: 18 MG/DL
CALCIUM SERPL-MCNC: 9.4 MG/DL
CHLORIDE SERPL-SCNC: 101 MMOL/L
CHOLEST SERPL-MCNC: 161 MG/DL
CO2 SERPL-SCNC: 26 MMOL/L
CREAT SERPL-MCNC: 0.88 MG/DL
EGFRCR SERPLBLD CKD-EPI 2021: 77 ML/MIN/1.73M2
GLUCOSE SERPL-MCNC: 74 MG/DL
HDLC SERPL-MCNC: 66 MG/DL
LDLC SERPL CALC-MCNC: 72 MG/DL
NONHDLC SERPL-MCNC: 95 MG/DL
POTASSIUM SERPL-SCNC: 6 MMOL/L
PROT SERPL-MCNC: 7.2 G/DL
SODIUM SERPL-SCNC: 142 MMOL/L
TRIGL SERPL-MCNC: 129 MG/DL

## 2025-03-11 ENCOUNTER — TRANSCRIPTION ENCOUNTER (OUTPATIENT)
Age: 55
End: 2025-03-11

## 2025-03-11 LAB
ANION GAP SERPL CALC-SCNC: 11 MMOL/L
BUN SERPL-MCNC: 13 MG/DL
CALCIUM SERPL-MCNC: 9.3 MG/DL
CHLORIDE SERPL-SCNC: 107 MMOL/L
CO2 SERPL-SCNC: 24 MMOL/L
CREAT SERPL-MCNC: 0.84 MG/DL
EGFRCR SERPLBLD CKD-EPI 2021: 82 ML/MIN/1.73M2
GLUCOSE SERPL-MCNC: 99 MG/DL
POTASSIUM SERPL-SCNC: 5 MMOL/L
SODIUM SERPL-SCNC: 141 MMOL/L

## 2025-03-13 ENCOUNTER — TRANSCRIPTION ENCOUNTER (OUTPATIENT)
Age: 55
End: 2025-03-13

## 2025-03-21 ENCOUNTER — TRANSCRIPTION ENCOUNTER (OUTPATIENT)
Age: 55
End: 2025-03-21

## 2025-04-18 ENCOUNTER — TRANSCRIPTION ENCOUNTER (OUTPATIENT)
Age: 55
End: 2025-04-18

## 2025-04-21 ENCOUNTER — NON-APPOINTMENT (OUTPATIENT)
Age: 55
End: 2025-04-21

## 2025-04-23 ENCOUNTER — APPOINTMENT (OUTPATIENT)
Dept: ORTHOPEDIC SURGERY | Facility: CLINIC | Age: 55
End: 2025-04-23
Payer: COMMERCIAL

## 2025-04-23 VITALS
DIASTOLIC BLOOD PRESSURE: 78 MMHG | TEMPERATURE: 98.11 F | HEIGHT: 60 IN | RESPIRATION RATE: 17 BRPM | OXYGEN SATURATION: 95 % | BODY MASS INDEX: 29.45 KG/M2 | HEART RATE: 84 BPM | WEIGHT: 150 LBS | SYSTOLIC BLOOD PRESSURE: 116 MMHG

## 2025-04-23 DIAGNOSIS — M76.32 ILIOTIBIAL BAND SYNDROME, LEFT LEG: ICD-10-CM

## 2025-04-23 PROCEDURE — 73564 X-RAY EXAM KNEE 4 OR MORE: CPT | Mod: LT

## 2025-04-23 PROCEDURE — 99203 OFFICE O/P NEW LOW 30 MIN: CPT

## 2025-04-23 PROCEDURE — G2211 COMPLEX E/M VISIT ADD ON: CPT | Mod: NC

## 2025-05-06 ENCOUNTER — TRANSCRIPTION ENCOUNTER (OUTPATIENT)
Age: 55
End: 2025-05-06

## 2025-06-30 ENCOUNTER — TRANSCRIPTION ENCOUNTER (OUTPATIENT)
Age: 55
End: 2025-06-30

## 2025-07-02 ENCOUNTER — NON-APPOINTMENT (OUTPATIENT)
Age: 55
End: 2025-07-02

## 2025-07-08 ENCOUNTER — APPOINTMENT (OUTPATIENT)
Dept: DERMATOLOGY | Facility: CLINIC | Age: 55
End: 2025-07-08

## 2025-07-08 PROCEDURE — 99213 OFFICE O/P EST LOW 20 MIN: CPT

## 2025-07-10 ENCOUNTER — APPOINTMENT (OUTPATIENT)
Dept: OBGYN | Facility: CLINIC | Age: 55
End: 2025-07-10
Payer: COMMERCIAL

## 2025-07-10 ENCOUNTER — APPOINTMENT (OUTPATIENT)
Dept: INTERNAL MEDICINE | Facility: CLINIC | Age: 55
End: 2025-07-10
Payer: COMMERCIAL

## 2025-07-10 ENCOUNTER — ASOB RESULT (OUTPATIENT)
Age: 55
End: 2025-07-10

## 2025-07-10 VITALS
RESPIRATION RATE: 16 BRPM | SYSTOLIC BLOOD PRESSURE: 118 MMHG | HEART RATE: 87 BPM | DIASTOLIC BLOOD PRESSURE: 84 MMHG | BODY MASS INDEX: 28.66 KG/M2 | HEIGHT: 60 IN | TEMPERATURE: 98.2 F | WEIGHT: 146 LBS | OXYGEN SATURATION: 97 %

## 2025-07-10 PROBLEM — E55.9 VITAMIN D DEFICIENCY: Status: ACTIVE | Noted: 2025-07-10

## 2025-07-10 PROCEDURE — 36415 COLL VENOUS BLD VENIPUNCTURE: CPT

## 2025-07-10 PROCEDURE — G2211 COMPLEX E/M VISIT ADD ON: CPT | Mod: NC

## 2025-07-10 PROCEDURE — 99213 OFFICE O/P EST LOW 20 MIN: CPT

## 2025-07-10 PROCEDURE — 76830 TRANSVAGINAL US NON-OB: CPT

## 2025-07-11 LAB
BASOPHILS # BLD AUTO: 0.04 K/UL
BASOPHILS NFR BLD AUTO: 0.8 %
EOSINOPHIL # BLD AUTO: 0.09 K/UL
EOSINOPHIL NFR BLD AUTO: 1.7 %
HCT VFR BLD CALC: 44.7 %
HGB BLD-MCNC: 14.2 G/DL
IMM GRANULOCYTES NFR BLD AUTO: 0.6 %
LYMPHOCYTES # BLD AUTO: 1.77 K/UL
LYMPHOCYTES NFR BLD AUTO: 33.2 %
MAN DIFF?: NORMAL
MCHC RBC-ENTMCNC: 27.2 PG
MCHC RBC-ENTMCNC: 31.8 G/DL
MCV RBC AUTO: 85.6 FL
MONOCYTES # BLD AUTO: 0.34 K/UL
MONOCYTES NFR BLD AUTO: 6.4 %
NEUTROPHILS # BLD AUTO: 3.06 K/UL
NEUTROPHILS NFR BLD AUTO: 57.3 %
PLATELET # BLD AUTO: 267 K/UL
RBC # BLD: 5.22 M/UL
RBC # FLD: 16 %
WBC # FLD AUTO: 5.33 K/UL

## 2025-07-12 ENCOUNTER — TRANSCRIPTION ENCOUNTER (OUTPATIENT)
Age: 55
End: 2025-07-12

## 2025-07-12 LAB
25(OH)D3 SERPL-MCNC: 52.7 NG/ML
ALBUMIN SERPL ELPH-MCNC: 4.7 G/DL
ALP BLD-CCNC: 111 U/L
ALT SERPL-CCNC: 24 U/L
ANION GAP SERPL CALC-SCNC: 13 MMOL/L
AST SERPL-CCNC: 22 U/L
BILIRUB SERPL-MCNC: 0.3 MG/DL
BUN SERPL-MCNC: 13 MG/DL
CALCIUM SERPL-MCNC: 9.5 MG/DL
CHLORIDE SERPL-SCNC: 103 MMOL/L
CHOLEST SERPL-MCNC: 160 MG/DL
CO2 SERPL-SCNC: 24 MMOL/L
CREAT SERPL-MCNC: 0.86 MG/DL
EGFRCR SERPLBLD CKD-EPI 2021: 79 ML/MIN/1.73M2
GLUCOSE SERPL-MCNC: 100 MG/DL
HDLC SERPL-MCNC: 64 MG/DL
LDLC SERPL-MCNC: 76 MG/DL
NONHDLC SERPL-MCNC: 96 MG/DL
POTASSIUM SERPL-SCNC: 4.5 MMOL/L
PROT SERPL-MCNC: 7.3 G/DL
SODIUM SERPL-SCNC: 140 MMOL/L
TRIGL SERPL-MCNC: 113 MG/DL
TSH SERPL-ACNC: 1.23 UIU/ML

## 2025-07-14 ENCOUNTER — TRANSCRIPTION ENCOUNTER (OUTPATIENT)
Age: 55
End: 2025-07-14

## 2025-07-15 ENCOUNTER — RESULT REVIEW (OUTPATIENT)
Age: 55
End: 2025-07-15

## 2025-08-20 ENCOUNTER — APPOINTMENT (OUTPATIENT)
Dept: OBGYN | Facility: CLINIC | Age: 55
End: 2025-08-20
Payer: COMMERCIAL

## 2025-08-20 VITALS
WEIGHT: 148 LBS | DIASTOLIC BLOOD PRESSURE: 70 MMHG | SYSTOLIC BLOOD PRESSURE: 140 MMHG | BODY MASS INDEX: 29.06 KG/M2 | HEIGHT: 60 IN

## 2025-08-20 DIAGNOSIS — Z78.0 ASYMPTOMATIC MENOPAUSAL STATE: ICD-10-CM

## 2025-08-20 DIAGNOSIS — B97.7 PAPILLOMAVIRUS AS THE CAUSE OF DISEASES CLASSIFIED ELSEWHERE: ICD-10-CM

## 2025-08-20 DIAGNOSIS — Z01.419 ENCOUNTER FOR GYNECOLOGICAL EXAMINATION (GENERAL) (ROUTINE) W/OUT ABNORMAL FINDINGS: ICD-10-CM

## 2025-08-20 PROCEDURE — 99396 PREV VISIT EST AGE 40-64: CPT

## 2025-08-20 RX ORDER — PROGESTERONE 100 MG/1
100 CAPSULE ORAL
Qty: 90 | Refills: 3 | Status: ACTIVE | COMMUNITY
Start: 2025-08-20 | End: 1900-01-01

## 2025-08-20 RX ORDER — ESTRADIOL 0.05 MG/D
0.05 PATCH, EXTENDED RELEASE TRANSDERMAL
Qty: 24 | Refills: 3 | Status: ACTIVE | COMMUNITY
Start: 2025-08-20 | End: 1900-01-01

## 2025-08-21 ENCOUNTER — APPOINTMENT (OUTPATIENT)
Dept: INTERNAL MEDICINE | Facility: CLINIC | Age: 55
End: 2025-08-21
Payer: COMMERCIAL

## 2025-08-21 VITALS
DIASTOLIC BLOOD PRESSURE: 82 MMHG | SYSTOLIC BLOOD PRESSURE: 128 MMHG | RESPIRATION RATE: 16 BRPM | OXYGEN SATURATION: 99 % | HEART RATE: 74 BPM | BODY MASS INDEX: 29.45 KG/M2 | TEMPERATURE: 97.8 F | HEIGHT: 60 IN | WEIGHT: 150 LBS

## 2025-08-21 DIAGNOSIS — Z00.00 ENCOUNTER FOR GENERAL ADULT MEDICAL EXAMINATION W/OUT ABNORMAL FINDINGS: ICD-10-CM

## 2025-08-21 PROCEDURE — 99396 PREV VISIT EST AGE 40-64: CPT

## 2025-08-25 LAB
CYTOLOGY CVX/VAG DOC THIN PREP: ABNORMAL
HPV HIGH+LOW RISK DNA PNL CVX: NOT DETECTED

## 2025-08-26 DIAGNOSIS — K74.00 HEPATIC FIBROSIS, UNSPECIFIED: ICD-10-CM

## 2025-09-15 ENCOUNTER — RESULT REVIEW (OUTPATIENT)
Age: 55
End: 2025-09-15

## 2025-09-20 ENCOUNTER — TRANSCRIPTION ENCOUNTER (OUTPATIENT)
Age: 55
End: 2025-09-20